# Patient Record
Sex: MALE | Race: WHITE | URBAN - METROPOLITAN AREA
[De-identification: names, ages, dates, MRNs, and addresses within clinical notes are randomized per-mention and may not be internally consistent; named-entity substitution may affect disease eponyms.]

---

## 2024-03-22 ENCOUNTER — TELEPHONE (OUTPATIENT)
Dept: PULMONOLOGY | Age: 74
End: 2024-03-22

## 2024-03-22 NOTE — TELEPHONE ENCOUNTER
Note:  [facility] Cabral [facility] for [facility]. CXR2 vw 2/20/24, CXR 1 vw 2/19/24, 2/8/24, CT angiogram PE 2/8/24 in [place].      [id number] preliminary exp 9/10/24  1. Pulmonary arteries:    Pulmonary embolus seen and a single segmental branch of the posterior right lower lobe. No evidence of right ventricular strain.      2. RV/LV Ratio:    RV/LV ratio ?? 0.9 (Normal)      3. Slight worsening of bilateral pleural effusions and bilateral pulmonary opacities/passive atelectasis with air bronchograms seen in the lower lobes.    4. No evidence of mediastinal adenopathy.   5. Thyroid nodularity.     Signed by: 2/8/2024 1:44 PM: Miguel Campos MD have asked referrals to request 2/2024 CT chest and 12/2023 CT chest abd pelvis to [facility] PACS.   Once requested imaging in PACS, patient can be scheduled with any provider in next appointment.

## 2024-03-26 NOTE — TELEPHONE ENCOUNTER
Imaging is in PACs.  Patient is scheduled next week to see Dr Chin.  I spoke with him and confirmed.

## 2024-04-04 ENCOUNTER — PREP FOR PROCEDURE (OUTPATIENT)
Dept: PULMONOLOGY | Age: 74
End: 2024-04-04

## 2024-04-04 ENCOUNTER — OFFICE VISIT (OUTPATIENT)
Dept: PULMONOLOGY | Age: 74
End: 2024-04-04
Payer: OTHER GOVERNMENT

## 2024-04-04 VITALS
SYSTOLIC BLOOD PRESSURE: 150 MMHG | WEIGHT: 250 LBS | HEART RATE: 74 BPM | HEIGHT: 74 IN | OXYGEN SATURATION: 96 % | RESPIRATION RATE: 14 BRPM | TEMPERATURE: 97.4 F | DIASTOLIC BLOOD PRESSURE: 80 MMHG | BODY MASS INDEX: 32.08 KG/M2

## 2024-04-04 DIAGNOSIS — Z87.891 PERSONAL HISTORY OF TOBACCO USE, PRESENTING HAZARDS TO HEALTH: ICD-10-CM

## 2024-04-04 DIAGNOSIS — J90 PLEURAL EFFUSION: ICD-10-CM

## 2024-04-04 DIAGNOSIS — I26.99 ACUTE PULMONARY EMBOLISM WITHOUT ACUTE COR PULMONALE, UNSPECIFIED PULMONARY EMBOLISM TYPE (HCC): Primary | ICD-10-CM

## 2024-04-04 DIAGNOSIS — J90 PLEURAL EFFUSION, RIGHT: ICD-10-CM

## 2024-04-04 DIAGNOSIS — J90 RECURRENT PLEURAL EFFUSION: ICD-10-CM

## 2024-04-04 PROCEDURE — 99204 OFFICE O/P NEW MOD 45 MIN: CPT | Performed by: INTERNAL MEDICINE

## 2024-04-04 PROCEDURE — 76604 US EXAM CHEST: CPT | Performed by: INTERNAL MEDICINE

## 2024-04-04 PROCEDURE — 1123F ACP DISCUSS/DSCN MKR DOCD: CPT | Performed by: INTERNAL MEDICINE

## 2024-04-04 RX ORDER — CHOLECALCIFEROL (VITAMIN D3) 1250 MCG
50000 CAPSULE ORAL DAILY
COMMUNITY

## 2024-04-04 RX ORDER — SODIUM CHLORIDE 0.9 % (FLUSH) 0.9 %
5-40 SYRINGE (ML) INJECTION PRN
Status: CANCELLED | OUTPATIENT
Start: 2024-04-04

## 2024-04-04 RX ORDER — SODIUM CHLORIDE 0.9 % (FLUSH) 0.9 %
5-40 SYRINGE (ML) INJECTION EVERY 12 HOURS SCHEDULED
Status: CANCELLED | OUTPATIENT
Start: 2024-04-04

## 2024-04-04 RX ORDER — LOSARTAN POTASSIUM 100 MG/1
100 TABLET ORAL NIGHTLY
COMMUNITY

## 2024-04-04 RX ORDER — ALLOPURINOL 100 MG/1
100 TABLET ORAL NIGHTLY
COMMUNITY
Start: 2017-06-10

## 2024-04-04 RX ORDER — FUROSEMIDE 40 MG/1
40 TABLET ORAL DAILY
COMMUNITY
Start: 2024-02-08

## 2024-04-04 RX ORDER — MECLIZINE HCL 12.5 MG/1
TABLET ORAL
COMMUNITY
Start: 2015-10-06

## 2024-04-04 RX ORDER — FLUTICASONE PROPIONATE 50 MCG
SPRAY, SUSPENSION (ML) NASAL
COMMUNITY
Start: 2016-02-01

## 2024-04-04 RX ORDER — SODIUM CHLORIDE 9 MG/ML
INJECTION, SOLUTION INTRAVENOUS PRN
Status: CANCELLED | OUTPATIENT
Start: 2024-04-04

## 2024-04-04 RX ORDER — POTASSIUM CHLORIDE 20 MEQ/1
20 TABLET, EXTENDED RELEASE ORAL DAILY
COMMUNITY
Start: 2024-02-20

## 2024-04-04 RX ORDER — METOPROLOL SUCCINATE 25 MG/1
TABLET, EXTENDED RELEASE ORAL
COMMUNITY
Start: 2024-02-11

## 2024-04-04 RX ORDER — ALBUTEROL SULFATE 90 UG/1
2 AEROSOL, METERED RESPIRATORY (INHALATION) EVERY 6 HOURS PRN
COMMUNITY
Start: 2019-02-08

## 2024-04-04 RX ORDER — ATORVASTATIN CALCIUM 10 MG/1
5 TABLET, FILM COATED ORAL NIGHTLY
COMMUNITY
Start: 2017-06-10

## 2024-04-04 RX ORDER — ISOSORBIDE MONONITRATE 30 MG/1
TABLET, EXTENDED RELEASE ORAL
COMMUNITY
Start: 2024-02-11

## 2024-04-04 NOTE — PROGRESS NOTES
Name:  Miller Gutiérrez  YOB: 1950   MRN: [id number]      Office Visit: 4/4/2024        ASSESSMENT AND PLAN:  (Medical Decision Making)    Impression: 73 y.o. male with former tobacco abuse, diagnosed with R PE in Feb 2024. On eliquis, tolerating well.   Ongoing SOB above what he thinks is his normal.   TTE then without R heart strain. Possible COPD but not typical symptoms.   He does have R>L pleural effusions present for the first time in December and no attempt at drainage. Was told these were chronic at [facility] but they do not appear to be so. Likely started in the setting of his intussusception.     -will set him up for thoracentesis.   In office US performed of B thorax with only visible fluid on the right.     -discussed course of anticoagulation with him. At least 3 months and preferably 6. Increased sedentary lifestyle was likely the cause.   -COPD may also be present as a cause for his TRAMMELL. Will check cPFT\"s after his thora has been completed. Start Incruse if COPD found. Already has prn albuterol.   -does not qualify for lung ca screening ct.     FU in 3 months.       There are no diagnoses linked to this encounter.  No orders of the defined types were placed in this encounter.    No orders of the defined types were placed in this encounter.      Immanuel Chin MD    No specialty comments available.    _________________________________________________________________________    HISTORY OF PRESENT ILLNESS:    Mr. Miller Gutiérrez is a 73 y.o. male who is seen at [facility] today for  No chief complaint on file.   He is seen as a new pt [facility]. He has a history of HTN, hep C. He states that he was seen here years ago for hemoptysis. Underwent bronchoscopy without any significant findings, was treated with abx and symptoms resolve.   He had PNA in 2011 which was treated through the ER.   Former smoker. Total of 30 pk/yrs, quit in 2008.     He had surgery

## 2024-04-17 ENCOUNTER — HOSPITAL ENCOUNTER (OUTPATIENT)
Age: 74
Setting detail: OUTPATIENT SURGERY
Discharge: HOME | End: 2024-04-17
Attending: INTERNAL MEDICINE | Admitting: INTERNAL MEDICINE
Payer: OTHER GOVERNMENT

## 2024-04-17 LAB
APPEARANCE FLD: NORMAL
BASOPHILS NFR FLD: 1 %
COLOR FLD: YELLOW
GLUCOSE FLD-MCNC: 126 MG/DL
LDH FLD L TO P-CCNC: 70 U/L
LYMPHOCYTES NFR BRONCH MANUAL: 86 %
MACROPHAGES NFR BRONCH MANUAL: 6 %
NEUTROPHILS NFR BRONCH MANUAL: 7 %
NUC CELL # FLD: 1255 /CU MM
PROT FLD-MCNC: 3 G/DL
RBC # FLD: 2000 /CU MM
SPECIMEN SOURCE FLD: NORMAL

## 2024-04-17 PROCEDURE — 3609027000 HC THORACENTESIS W/ULTRASOUND: Performed by: INTERNAL MEDICINE

## 2024-04-17 PROCEDURE — 89050 BODY FLUID CELL COUNT: CPT

## 2024-04-17 PROCEDURE — C1729 CATH, DRAINAGE: HCPCS | Performed by: INTERNAL MEDICINE

## 2024-04-17 PROCEDURE — 84157 ASSAY OF PROTEIN OTHER: CPT

## 2024-04-17 PROCEDURE — 32555 ASPIRATE PLEURA W/ IMAGING: CPT | Performed by: INTERNAL MEDICINE

## 2024-04-17 PROCEDURE — 87070 CULTURE OTHR SPECIMN AEROBIC: CPT

## 2024-04-17 PROCEDURE — 2709999900 HC NON-CHARGEABLE SUPPLY: Performed by: INTERNAL MEDICINE

## 2024-04-17 PROCEDURE — 88305 TISSUE EXAM BY PATHOLOGIST: CPT

## 2024-04-17 PROCEDURE — 76604 US EXAM CHEST: CPT | Performed by: INTERNAL MEDICINE

## 2024-04-17 PROCEDURE — 83615 LACTATE (LD) (LDH) ENZYME: CPT

## 2024-04-17 PROCEDURE — 87116 MYCOBACTERIA CULTURE: CPT

## 2024-04-17 PROCEDURE — 87206 SMEAR FLUORESCENT/ACID STAI: CPT

## 2024-04-17 PROCEDURE — 87205 SMEAR GRAM STAIN: CPT

## 2024-04-17 PROCEDURE — 82945 GLUCOSE OTHER FLUID: CPT

## 2024-04-17 PROCEDURE — 88112 CYTOPATH CELL ENHANCE TECH: CPT

## 2024-04-17 NOTE — DISCHARGE INSTRUCTIONS
Thoracentesis: What to Expect at Home  Your Recovery  Thoracentesis (say \"iveb-wg-elg-YONG-sis\") is a procedure to remove fluid from the space between the lungs and the chest wall (pleural space). This procedure may also be called a \"chest tap.\" It's normal to have a small amount of fluid in the pleural space. But too much fluid can build up because of problems such as infection, heart failure, or lung cancer. The procedure may have been done to help with shortness of breath and pain caused by the fluid buildup. Or you may have had this procedure so the doctor could test the fluid to find the cause of the buildup.  Your chest may be sore where the doctor put the needle or catheter into your skin (the puncture site). This usually gets better after a day or two. You can go back to work or your normal activities as soon as you feel up to it.  If a large amount of pleural fluid was removed during the procedure, you will probably be able to breathe more easily.  If more pleural fluid collects and needs to be removed, another thoracentesis may be done later.  This care sheet gives you a general idea about how long it will take for you to recover. But each person recovers at a different pace. Follow the steps below to feel better as quickly as possible.  How can you care for yourself at home?  Activity    Rest when you feel tired. Getting enough sleep will help you recover.     Avoid strenuous activities, such as bicycle riding, jogging, weight lifting, or aerobic exercise, until your doctor says it is okay.     You may shower. Do not take a bath until the puncture site has healed, or until your doctor tells you it is okay.     Ask your doctor when you can drive again.     You may need to take 1 or 2 days off from work. It depends on the type of work you do and how you feel.   Diet    You can eat your normal diet.     Drink plenty of fluids (unless your doctor tells you not to).   Medicines    Your doctor will tell you

## 2024-04-17 NOTE — OP NOTE
PROCEDURE:  DIAGNOSTIC THORACENTESIS and THERAPEUTIC THORACENTESIS       PRE-OP DIAGNOSIS:  R PLEURAL EFFUSION    POST-OP DIAGNOSIS:  R PLEURAL EFFUSION    VOLUME REMOVED:    400cc    ANESTHESIA:    LOCAL ANESTHESIA WITH 1% LIDOCAINE 10 CC TOTAL.      CHEST ULTRASOUND FINDINGS:    A Turbo-M, Sonosite ultrasound with a 5-16 mHz probe was used to image the chest and localize the pleural effusion on the right chest.    A moderate anechoic space was seen on the right consistent with an uncomplicated pleural effusion.        DESCRIPTION OF PROCEDURE:    After obtaining informed consent and localizing the safest location for thoracentesis, the  8th intercostal space was marked with a blunt, plastic needle cap in the mid scapular line.    An [employer] AK-0100 Pleral-Seal thoracentesis kit was used to perform the procedure.    The skin was cleansed with the supplied chlorhexidine swab and then draped in the usual fashion.    Using the previously marked location as a guide, a 22 G 1.5 inch needle was used to inject 1% lidocaine into the skin and subcutaneous tissue, as well as onto the underlying rib and inter-costal muscles.  Pleural fluid was aspirated to assure proper location and additional lidocaine was injected into the pleural space prior to removing the anesthesia needle.    A 3mm incision was then made with the supplied scalpel in the usual fashion to facilitate the insertion of the thoracentesis needle.    The needle with an 8 Telugu thoracentesis catheter was then introduced into the chest through the previously made incision in the usual fashion, the rib localized with the needle, and the catheter then marched over the rib into the pleural space.    After aspirating fluid, the thoracentesis catheter was then placed into the chest using the needle itself as a trocar.  The needle was then removed and the catheter was attached to the supplied tubing without complication.    400 cc of clear, yellow fluid was

## 2024-04-17 NOTE — INTERVAL H&P NOTE
Update History & Physical    The patient's History and Physical of April 4,  was reviewed with the patient and I examined the patient. There was no change. The surgical site was confirmed by the patient and me.     Plan: The risks, benefits, expected outcome, and alternative to the recommended procedure have been discussed with the patient. Patient understands and wants to proceed with the procedure.     Electronically signed by Immanuel Chin MD on 4/17/2024 at 10:59 AM       [FreeTextEntry1] : obese [] : septum deviated to the left [de-identified] : large ITs bilat [Removed] : palatine tonsils previously removed [Laryngoscopy Performed] : laryngoscopy was performed, see procedure section for findings [de-identified] : partially absent uvula [Normal] : no rashes

## 2024-04-19 LAB
BACTERIA SPEC CULT: NORMAL
GRAM STN SPEC: NORMAL
GRAM STN SPEC: NORMAL
SERVICE CMNT-IMP: NORMAL

## 2024-04-20 LAB
ACID FAST STN SPEC: NEGATIVE
FUNGAL CULT/SMEAR: NORMAL
MYCOBACTERIUM SPEC QL CULT: NORMAL
SPECIMEN PREPARATION: NORMAL
SPECIMEN PROCESSING: NORMAL
SPECIMEN SOURCE: NORMAL
SPECIMEN SOURCE: NORMAL

## 2024-04-22 LAB
FUNGAL CULT/SMEAR: NORMAL
FUNGUS SMEAR: NORMAL
SPECIMEN PROCESSING: NORMAL
SPECIMEN SOURCE: NORMAL
SPECIMEN SOURCE: NORMAL

## 2024-04-30 LAB
FUNGUS SMEAR: NORMAL
SPECIMEN SOURCE: NORMAL

## 2024-05-02 ENCOUNTER — NURSE ONLY (OUTPATIENT)
Dept: PULMONOLOGY | Age: 74
End: 2024-05-02
Payer: OTHER GOVERNMENT

## 2024-05-02 DIAGNOSIS — R06.09 DOE (DYSPNEA ON EXERTION): Primary | ICD-10-CM

## 2024-05-02 LAB
FEV 1 , POC: 1.33 L
FEV1 % PRED, POC: 35 %
FEV1/FVC, POC: 54
FVC % PRED, POC: 49 %
FVC, POC: 2.47

## 2024-05-02 PROCEDURE — 94729 DIFFUSING CAPACITY: CPT | Performed by: INTERNAL MEDICINE

## 2024-05-02 PROCEDURE — 94060 EVALUATION OF WHEEZING: CPT | Performed by: INTERNAL MEDICINE

## 2024-05-02 PROCEDURE — 94726 PLETHYSMOGRAPHY LUNG VOLUMES: CPT | Performed by: INTERNAL MEDICINE

## 2024-05-02 ASSESSMENT — PULMONARY FUNCTION TESTS
FEV1_PERCENT_PREDICTED_POC: 35
FVC_PERCENT_PREDICTED_POC: 49
FVC_POC: 2.47
FEV1/FVC_POC: 54

## 2024-05-17 ENCOUNTER — TELEPHONE (OUTPATIENT)
Dept: PULMONOLOGY | Age: 74
End: 2024-05-17

## 2024-05-17 LAB
FUNGAL CULT/SMEAR: NORMAL
FUNGUS (MYCOLOGY) CULTURE: NORMAL
FUNGUS SMEAR: NORMAL
REFLEX TO ID: NEGATIVE
SPECIMEN PROCESSING: NORMAL
SPECIMEN SOURCE: NORMAL
SPECIMEN SOURCE: NORMAL

## 2024-05-17 NOTE — TELEPHONE ENCOUNTER
Spoke with the patient in regards to their CPFT results, explained per Dr. Chin that the breathing tests did have changes consistent with COPD.  I also explained that Dr. Chin would like to start an inhaler called Incruse  to see if this will help.  Patient understood the results and was agreeable to try the Incruse.  Prescription has been pended to the patient's preferred pharmacy and no further questions or concerns were asked at this time.    Dr. Chin, please sign off on the Incruse when you are available.  Thank you so much! // Fernanda King M.A.

## 2024-05-17 NOTE — TELEPHONE ENCOUNTER
----- Message from Immanuel Chin MD sent at 5/15/2024  8:25 PM EDT -----  Can you let the patient know that his breathing tests did have changes consistent with COPD. Can we call him in incruse if he is willing. Thank you.     Immanuel Chin MD

## 2024-05-19 RX ORDER — UMECLIDINIUM 62.5 UG/1
1 AEROSOL, POWDER ORAL DAILY
Qty: 1 EACH | Refills: 11 | Status: SHIPPED | OUTPATIENT
Start: 2024-05-19

## 2024-06-18 LAB
ACID FAST STN SPEC: NEGATIVE
MYCOBACTERIUM SPEC QL CULT: NEGATIVE
SPECIMEN PREPARATION: NORMAL
SPECIMEN SOURCE: NORMAL

## 2024-07-09 ENCOUNTER — OFFICE VISIT (OUTPATIENT)
Dept: PULMONOLOGY | Age: 74
End: 2024-07-09
Payer: OTHER GOVERNMENT

## 2024-07-09 VITALS
DIASTOLIC BLOOD PRESSURE: 80 MMHG | BODY MASS INDEX: 34.14 KG/M2 | HEIGHT: 74 IN | WEIGHT: 266 LBS | SYSTOLIC BLOOD PRESSURE: 140 MMHG | RESPIRATION RATE: 20 BRPM | HEART RATE: 77 BPM | OXYGEN SATURATION: 97 % | TEMPERATURE: 98 F

## 2024-07-09 DIAGNOSIS — Z87.891 PERSONAL HISTORY OF TOBACCO USE, PRESENTING HAZARDS TO HEALTH: ICD-10-CM

## 2024-07-09 DIAGNOSIS — J44.9 CHRONIC OBSTRUCTIVE PULMONARY DISEASE, UNSPECIFIED COPD TYPE (HCC): Primary | ICD-10-CM

## 2024-07-09 DIAGNOSIS — J90 PLEURAL EFFUSION: ICD-10-CM

## 2024-07-09 DIAGNOSIS — Z86.711 HISTORY OF PULMONARY EMBOLISM: ICD-10-CM

## 2024-07-09 PROCEDURE — 76604 US EXAM CHEST: CPT | Performed by: INTERNAL MEDICINE

## 2024-07-09 PROCEDURE — 1123F ACP DISCUSS/DSCN MKR DOCD: CPT | Performed by: INTERNAL MEDICINE

## 2024-07-09 PROCEDURE — 99214 OFFICE O/P EST MOD 30 MIN: CPT | Performed by: INTERNAL MEDICINE

## 2024-07-09 RX ORDER — UMECLIDINIUM 62.5 UG/1
1 AEROSOL, POWDER ORAL DAILY
Qty: 1 EACH | Refills: 11 | Status: CANCELLED | OUTPATIENT
Start: 2024-07-09

## 2024-07-09 RX ORDER — ALBUTEROL SULFATE 2.5 MG/3ML
2.5 SOLUTION RESPIRATORY (INHALATION) EVERY 6 HOURS PRN
Qty: 120 EACH | Refills: 11 | Status: SHIPPED | OUTPATIENT
Start: 2024-07-09

## 2024-07-09 NOTE — PROGRESS NOTES
effusions and bilateral pulmonary opacities/passive atelectasis with air bronchograms seen in the lower lobes.  4. No evidence of mediastinal adenopathy.  5. Thyroid nodularity.    Signed by: 2024 1:44 PM: Beth YEUNG, [facility] Medicine: No results found for this or any previous visit from the past 3650 days.      PFTs:        No data to display              Results for orders placed or performed in visit on 24   AMB POC SPIROMETRY W/BRONCHODILATOR   Result Value Ref Range    FEV 1 , POC 1.33 L    FEV1 % Pred POC 35 %    FVC, POC 2.47     FVC % Pred POC 49 %    FEV1/FVC, POC 54     No results found for this or any previous visit.    FeNO: No results found for this or any previous visit.  FeNO and Likelihood of Eosinophilic Asthma   Unlikely Intermediate Likely   <25 ppb 25-50 ppb >50ppb     Exercise Oximetry:    Echo: No results found for this or any previous visit from the past 3650 days.      [facility] Reference Info:                                                                                                                    Immunization History   Administered Date(s) Administered    Influenza Virus Vaccine 2013, 2015, 2017    Influenza, High Dose (Fluzone 65 yrs and older) 2017, 10/23/2018, 10/01/2019    Pneumococcal, PCV-13, PREVNAR 13, (age 6w+), IM, 0.5mL 2017    Pneumococcal, PPSV23, PNEUMOVAX 23, (age 2y+), SC/IM, 0.5mL 2015    TD 5LF, TENIVAC, (age 7y+), IM, 0.5mL 2005    TDaP, ADACEL (age 10y-64y), BOOSTRIX (age 10y+), IM, 0.5mL 2015     Past Medical History:   Diagnosis Date    Dyspnea     Pleural effusion         Tobacco Use      Smoking status: Former        Packs/day: 0.00        Years: 1.5 packs/day for 38.0 years (57.0 ttl pk-yrs)        Types: Cigarettes        Start date:         Quit date:         Years since quittin.5      Smokeless tobacco: Never    Allergies   Allergen Reactions    Sulfa Antibiotics

## 2024-10-15 ENCOUNTER — OFFICE VISIT (OUTPATIENT)
Dept: PULMONOLOGY | Age: 74
End: 2024-10-15
Payer: OTHER GOVERNMENT

## 2024-10-15 VITALS
HEIGHT: 74 IN | TEMPERATURE: 98 F | HEART RATE: 71 BPM | WEIGHT: 274 LBS | OXYGEN SATURATION: 96 % | SYSTOLIC BLOOD PRESSURE: 120 MMHG | RESPIRATION RATE: 20 BRPM | BODY MASS INDEX: 35.16 KG/M2 | DIASTOLIC BLOOD PRESSURE: 80 MMHG

## 2024-10-15 DIAGNOSIS — Z23 NEEDS FLU SHOT: Primary | ICD-10-CM

## 2024-10-15 DIAGNOSIS — Z86.711 HISTORY OF PULMONARY EMBOLISM: ICD-10-CM

## 2024-10-15 DIAGNOSIS — J44.9 CHRONIC OBSTRUCTIVE PULMONARY DISEASE, UNSPECIFIED COPD TYPE (HCC): ICD-10-CM

## 2024-10-15 DIAGNOSIS — Z87.891 PERSONAL HISTORY OF TOBACCO USE, PRESENTING HAZARDS TO HEALTH: ICD-10-CM

## 2024-10-15 PROCEDURE — 90471 IMMUNIZATION ADMIN: CPT | Performed by: INTERNAL MEDICINE

## 2024-10-15 PROCEDURE — 99214 OFFICE O/P EST MOD 30 MIN: CPT | Performed by: INTERNAL MEDICINE

## 2024-10-15 PROCEDURE — 1123F ACP DISCUSS/DSCN MKR DOCD: CPT | Performed by: INTERNAL MEDICINE

## 2024-10-15 PROCEDURE — 90662 IIV NO PRSV INCREASED AG IM: CPT | Performed by: INTERNAL MEDICINE

## 2024-10-15 RX ORDER — ALBUTEROL SULFATE 0.83 MG/ML
2.5 SOLUTION RESPIRATORY (INHALATION) EVERY 6 HOURS PRN
Qty: 120 EACH | Refills: 11 | Status: SHIPPED | OUTPATIENT
Start: 2024-10-15

## 2024-10-15 ASSESSMENT — COPD QUESTIONNAIRES
QUESTION7_SLEEPQUALITY: 0
QUESTION5_HOMEACTIVITIES: 0
QUESTION1_COUGHFREQUENCY: 0
QUESTION4_WALKINCLINE: 2
QUESTION2_CHESTPHLEGM: 1
QUESTION3_CHESTTIGHTNESS: 0
QUESTION8_ENERGYLEVEL: 1
QUESTION6_LEAVINGHOUSE: 0
CAT_TOTALSCORE: 4

## 2024-10-15 NOTE — PROGRESS NOTES
Mild diffuse atherosclerosis is seen in the aorta and some its major branches. Coronary artery disease noted. No significant pericardial effusion is noted. 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    CHANDNI and MEDIASTINUM:  No axillary adenopathy is seen. The thyroid is inhomogeneous and somewhat nodular. The superficial soft tissues of the anterior chest are symmetrical bilaterally. No mediastinal adenopathy is seen.    SOFT TISSUES, SKELETAL and UPPER ABDOMEN:  No supraclavicular adenopathy is seen. Osteophytosis is seen in thoracic spine. The sternum appears to be intact. No suspicious lytic lesion of bone is seen. A tiny hypodensity is seen in the liver which is too small to characterize by CT. The spleen appears normal. The adrenals appear normal. The pancreatic tail appears normal. No fluid collection seen upper abdomen. The proximal abdominal aorta is normal caliber.    Impression  1. Pulmonary arteries:  Pulmonary embolus seen and a single segmental branch of the posterior right lower lobe. No evidence of right ventricular strain.    2. RV/LV Ratio:  RV/LV ratio ?? 0.9 (Normal)    3. Slight worsening of bilateral pleural effusions and bilateral pulmonary opacities/passive atelectasis with air bronchograms seen in the lower lobes.  4. No evidence of mediastinal adenopathy.  5. Thyroid nodularity.    Signed by: 2/8/2024 1:44 PM: Beth YEUNG, [facility]    Nuclear Medicine: No results found for this or any previous visit from the past 3650 days.      PFTs:        No data to display              Results for orders placed or performed in visit on 05/02/24   AMB POC SPIROMETRY W/BRONCHODILATOR   Result Value Ref Range    FEV 1 , POC 1.33 L    FEV1 % Pred POC 35 %    FVC, POC 2.47     FVC % Pred POC 49 %    FEV1/FVC, POC 54     No results found for this or any previous visit.    FeNO: No results found for this or any previous visit.  FeNO and Likelihood of Eosinophilic Asthma   Unlikely Intermediate Likely   <25 ppb 25-50 ppb >50ppb

## 2025-04-01 NOTE — PROGRESS NOTES
Name:  Miller Gutiérrez  YOB: 1950   MRN: [id number]      Office Visit: 4/2/2025      The patient (or guardian, if applicable) and other individuals in attendance with the patient were advised that Artificial Intelligence will be utilized during this visit to record, process the conversation to generate a clinical note, and support improvement of the AI technology. The patient (or guardian, if applicable) and other individuals in attendance at the appointment consented to the use of AI, including the recording.         Assessment & Plan (Medical Decision Making)      Impression: 74 y.o. male     Assessment & Plan  1. Chronic Obstructive Pulmonary Disease (COPD).  He has severe COPD, contributing to his shortness of breath during exertion. His oxygen levels are normal, but the destruction of air sacs from years of smoking causes air trapping and shortness of breath. His ankle injury and weight also contribute to this symptom. A comprehensive discussion was held regarding the potential benefits of pulmonary rehabilitation for his COPD management. He will continue his Spiriva regimen, supplemented with Wixela to enhance lung function and reduce inflammation. [facility] He is advised to use it as 2 puffs 10 to 15 minutes prior to exercise and every 4 hours if needed for relief of cough, wheezing, or shortness of breath. A referral to pulmonary rehabilitation at [facility] has been made. If he does not receive a call from them within 2 weeks, he is instructed to contact us.  - albuterol sulfate HFA (PROVENTIL;VENTOLIN;PROAIR) 108 (90 Base) MCG/ACT inhaler; Inhale 2 puffs into the lungs every 4 hours as needed for Wheezing or Shortness of Breath  Dispense: 3 each; Refill: 3  - Ambulatory referral to Pulmonary Rehab  - fluticasone-salmeterol (WIXELA INHUB) 250-50 MCG/ACT AEPB diskus inhaler; Inhale 1 puff into the lungs 2 times daily  Dispense: 3

## 2025-04-02 ENCOUNTER — OFFICE VISIT (OUTPATIENT)
Dept: PULMONOLOGY | Age: 75
End: 2025-04-02
Payer: OTHER GOVERNMENT

## 2025-04-02 VITALS
OXYGEN SATURATION: 95 % | SYSTOLIC BLOOD PRESSURE: 146 MMHG | HEIGHT: 74 IN | RESPIRATION RATE: 18 BRPM | BODY MASS INDEX: 36.42 KG/M2 | WEIGHT: 283.8 LBS | TEMPERATURE: 97.3 F | HEART RATE: 81 BPM | DIASTOLIC BLOOD PRESSURE: 84 MMHG

## 2025-04-02 DIAGNOSIS — J44.9 COPD, SEVERE (HCC): Primary | ICD-10-CM

## 2025-04-02 DIAGNOSIS — Z86.711 HISTORY OF PULMONARY EMBOLISM: ICD-10-CM

## 2025-04-02 DIAGNOSIS — R06.09 DYSPNEA ON EXERTION: ICD-10-CM

## 2025-04-02 PROCEDURE — 1123F ACP DISCUSS/DSCN MKR DOCD: CPT | Performed by: NURSE PRACTITIONER

## 2025-04-02 PROCEDURE — G2211 COMPLEX E/M VISIT ADD ON: HCPCS | Performed by: NURSE PRACTITIONER

## 2025-04-02 PROCEDURE — 99214 OFFICE O/P EST MOD 30 MIN: CPT | Performed by: NURSE PRACTITIONER

## 2025-04-02 RX ORDER — ALBUTEROL SULFATE 90 UG/1
2 INHALANT RESPIRATORY (INHALATION) EVERY 4 HOURS PRN
Qty: 3 EACH | Refills: 3 | Status: SHIPPED | OUTPATIENT
Start: 2025-04-02

## 2025-04-02 RX ORDER — FLUTICASONE PROPIONATE AND SALMETEROL 250; 50 UG/1; UG/1
1 POWDER RESPIRATORY (INHALATION) 2 TIMES DAILY
Qty: 3 EACH | Refills: 3 | Status: SHIPPED | OUTPATIENT
Start: 2025-04-02

## 2025-04-14 ENCOUNTER — TELEPHONE (OUTPATIENT)
Dept: PULMONOLOGY | Age: 75
End: 2025-04-14

## 2025-04-14 NOTE — TELEPHONE ENCOUNTER
Received a voicemail from [facility] stating that the [facility] is a [facility] patient and wants clarification if a [facility] [id number] Form has been [facility] to the [facility].  Blanquita, this patient previously saw Janette.  Can you verify if the [facility] form was sent? Thank you so much! // Fernanda SCHMITZ

## (undated) DEVICE — STERILE POLYISOPRENE POWDER-FREE SURGICAL GLOVES: Brand: PROTEXIS

## (undated) DEVICE — KIT THORCENT 8FR L5IN POLYUR W/ 18/22/25GA NDL 3 W STPCOCK